# Patient Record
Sex: FEMALE | Race: WHITE | NOT HISPANIC OR LATINO | Employment: FULL TIME | URBAN - METROPOLITAN AREA
[De-identification: names, ages, dates, MRNs, and addresses within clinical notes are randomized per-mention and may not be internally consistent; named-entity substitution may affect disease eponyms.]

---

## 2018-05-23 ENCOUNTER — DOCUMENTATION (OUTPATIENT)
Dept: BARIATRICS/WEIGHT MGMT | Facility: CLINIC | Age: 43
End: 2018-05-23

## 2018-05-23 DIAGNOSIS — R73.03 PRE-DIABETES: ICD-10-CM

## 2018-05-23 DIAGNOSIS — R53.83 FATIGUE, UNSPECIFIED TYPE: ICD-10-CM

## 2018-05-23 DIAGNOSIS — R06.00 DYSPNEA, UNSPECIFIED TYPE: Primary | ICD-10-CM

## 2018-05-23 RX ORDER — ATORVASTATIN CALCIUM 20 MG/1
20 TABLET, FILM COATED ORAL DAILY
COMMUNITY

## 2018-05-23 RX ORDER — HYDROCHLOROTHIAZIDE 12.5 MG/1
12.5 TABLET ORAL DAILY
COMMUNITY

## 2018-05-23 RX ORDER — FLUOXETINE HYDROCHLORIDE 20 MG/1
20 CAPSULE ORAL DAILY
COMMUNITY

## 2018-05-23 RX ORDER — AMLODIPINE BESYLATE 5 MG/1
5 TABLET ORAL DAILY
COMMUNITY

## 2018-05-23 RX ORDER — FENOFIBRATE 134 MG/1
134 CAPSULE ORAL
COMMUNITY

## 2018-05-23 RX ORDER — LEVOTHYROXINE SODIUM 112 UG/1
112 TABLET ORAL DAILY
COMMUNITY

## 2018-05-30 DIAGNOSIS — R06.00 DYSPNEA, UNSPECIFIED TYPE: ICD-10-CM

## 2018-05-30 DIAGNOSIS — R53.83 FATIGUE, UNSPECIFIED TYPE: ICD-10-CM

## 2018-05-30 DIAGNOSIS — R73.03 PRE-DIABETES: ICD-10-CM

## 2018-05-31 ENCOUNTER — OFFICE VISIT (OUTPATIENT)
Dept: BARIATRICS/WEIGHT MGMT | Facility: CLINIC | Age: 43
End: 2018-05-31

## 2018-05-31 VITALS
OXYGEN SATURATION: 99 % | HEART RATE: 81 BPM | DIASTOLIC BLOOD PRESSURE: 73 MMHG | HEIGHT: 69 IN | BODY MASS INDEX: 36.73 KG/M2 | WEIGHT: 248.01 LBS | RESPIRATION RATE: 18 BRPM | SYSTOLIC BLOOD PRESSURE: 108 MMHG | TEMPERATURE: 98.6 F

## 2018-05-31 DIAGNOSIS — M19.90 OSTEOARTHRITIS, UNSPECIFIED OSTEOARTHRITIS TYPE, UNSPECIFIED SITE: ICD-10-CM

## 2018-05-31 DIAGNOSIS — R53.83 FATIGUE, UNSPECIFIED TYPE: Primary | ICD-10-CM

## 2018-05-31 DIAGNOSIS — E78.5 HYPERLIPIDEMIA, UNSPECIFIED HYPERLIPIDEMIA TYPE: ICD-10-CM

## 2018-05-31 DIAGNOSIS — R10.13 DYSPEPSIA: ICD-10-CM

## 2018-05-31 DIAGNOSIS — M54.9 BACK PAIN, UNSPECIFIED BACK LOCATION, UNSPECIFIED BACK PAIN LATERALITY, UNSPECIFIED CHRONICITY: ICD-10-CM

## 2018-05-31 DIAGNOSIS — I10 ESSENTIAL HYPERTENSION: ICD-10-CM

## 2018-05-31 DIAGNOSIS — R06.00 DYSPNEA, UNSPECIFIED TYPE: ICD-10-CM

## 2018-05-31 DIAGNOSIS — R73.03 PRE-DIABETES: ICD-10-CM

## 2018-05-31 DIAGNOSIS — E66.9 OBESITY, CLASS II, BMI 35-39.9: ICD-10-CM

## 2018-05-31 PROCEDURE — 99204 OFFICE O/P NEW MOD 45 MIN: CPT | Performed by: SURGERY

## 2018-05-31 RX ORDER — METOPROLOL SUCCINATE 25 MG/1
25 TABLET, EXTENDED RELEASE ORAL DAILY
COMMUNITY

## 2018-05-31 NOTE — PROGRESS NOTES
[facility] GROUP BARIATRIC SURGERY  [address]  [facility] [address]  [phone]      Patient  Name:  Caroline Christian  :  1975      Date of Visit: 2018      Chief Complaint:  weight gain; unable to maintain weight loss    History of Present Illness:  Caroline Christian is a 42 y.o. female who presents today for evaluation, education and consultation regarding weight loss surgery. The patient is interested in sleeve gastrectomy.     Caroline has been overweight for at least 16 years, has been 35 pounds or more overweight for at least 16 years, has been 100 pounds or more overweight for 16 or more years and started dieting at age 23.  The patient describes their eating habits as eating wrong foods.      Previous diet attempts include: High Protein and Reed's Diet; None; None.  The most weight Caroline lost was 50 pounds on high protein but was only able to maintain that weight loss for 8 months.  Her maximum lifetime weight is 271 pounds.    As above, patient has been overweight for many years, with numerous failed dietary/weight loss attempts.  She now has obesity related comorbidities and as such has decided to pursue weight loss surgery.    All past medical, surgical, social and family history have been obtained and discussed as pertinent to bariatric surgery as below.   Her friend had a sleeve with Dr. Asif recently and is doing great!      Past Medical History:   Diagnosis Date   • Anxiety    • B12 deficiency    • Back pain    • Fatigue    • Hyperlipidemia    • Hypertension    • Hypertension    • Hypothyroid    • Osteoarthritis    • Prediabetes    • Snoring    • Vitamin D deficiency      Past Surgical History:   Procedure Laterality Date   •  SECTION  ,   • HYSTERECTOMY      laparoscopic hysterectomy and oophorectomy for fibroids   • LAPAROSCOPIC CHOLECYSTECTOMY  2004    function at 0%   • TUBAL ABDOMINAL LIGATION         Allergies no known  allergies    Current Outpatient Prescriptions:   •  amLODIPine (NORVASC) 5 MG tablet, Take 5 mg by mouth Daily., Disp: , Rfl:   •  atorvastatin (LIPITOR) 20 MG tablet, Take 20 mg by mouth Daily., Disp: , Rfl:   •  fenofibrate micronized (LOFIBRA) 134 MG capsule, Take 134 mg by mouth Every Morning Before Breakfast., Disp: , Rfl:   •  FLUoxetine (PROzac) 20 MG capsule, Take 20 mg by mouth Daily., Disp: , Rfl:   •  hydrochlorothiazide (HYDRODIURIL) 12.5 MG tablet, Take 12.5 mg by mouth Daily., Disp: , Rfl:   •  levothyroxine (SYNTHROID, LEVOTHROID) 112 MCG tablet, Take 112 mcg by mouth Daily., Disp: , Rfl:   •  Liraglutide (VICTOZA) 18 MG/3ML solution pen-injector injection, Inject 1.8 mg under the skin Daily., Disp: , Rfl:   •  metFORMIN (GLUCOPHAGE) 500 MG tablet, Take 500 mg by mouth 2 (Two) Times a Day With Meals., Disp: , Rfl:   •  metoprolol succinate XL (TOPROL-XL) 25 MG 24 hr tablet, Take 25 mg by mouth Daily., Disp: , Rfl:     Social History     Social History   • Marital status:      Spouse name: Luca Christian   • Number of children: 2   • Years of education: Associates Degree      Occupational History   • [employer]     no heavy lifting     Social History Main Topics   • Smoking status: Never Smoker   • Smokeless tobacco: Never Used      Comment: No secondhand smoke in house or car   • Alcohol use No   • Drug use: No   • Sexual activity: Not on file     Other Topics Concern   • Not on file     Social History Narrative    Lives with  and children.     Family History   Problem Relation Age of Onset   • Heart attack Mother 47         of massive heart attack   • Hypertension Mother    • Sleep apnea Mother    • Heart attack Father    • Diabetes Father    • Hypertension Father    • Cancer Maternal Grandmother    • Heart attack Paternal Grandfather        Review of Systems   Constitutional: Positive for fatigue and unexpected weight gain. Negative for chills,  diaphoresis, fever and unexpected weight loss.   HENT: Negative for congestion and facial swelling.    Eyes: Negative for blurred vision, double vision and discharge.   Respiratory: Negative for chest tightness, shortness of breath and stridor.    Cardiovascular: Negative for chest pain, palpitations and leg swelling.   Gastrointestinal: Negative for blood in stool and constipation.   Endocrine: Negative for polydipsia.   Genitourinary: Negative for frequency and hematuria.   Musculoskeletal: Positive for arthralgias and back pain.   Skin: Negative for color change.   Allergic/Immunologic: Negative for immunocompromised state.   Neurological: Negative for weakness and confusion.   Psychiatric/Behavioral: Negative for self-injury. The patient is nervous/anxious.         Physical Exam:  Vital Signs:  Weight: 112 kg (248 lb 0.2 oz)   Body mass index is 36.62 kg/m².  Temp: 98.6 °F (37 °C)   Heart Rate: 81   BP: 108/73     Physical Exam   Constitutional: She is oriented to person, place, and time. She appears well-developed and well-nourished.   HENT:   Head: Normocephalic and atraumatic.   Nose: Nose normal.   Eyes: Conjunctivae and EOM are normal. Pupils are equal, round, and reactive to light.   Neck: Normal range of motion. Neck supple. Carotid bruit is not present. No tracheal deviation present. No thyromegaly present.   Cardiovascular: Normal rate, regular rhythm and normal heart sounds.    Pulmonary/Chest: Effort normal and breath sounds normal. No respiratory distress.   Abdominal: Soft. She exhibits no distension. There is no hepatosplenomegaly. There is no tenderness.   Lap incision, C section scar   Musculoskeletal: Normal range of motion. She exhibits no edema or deformity.   Neurological: She is alert and oriented to person, place, and time. No cranial nerve deficit. Coordination normal.   Skin: Skin is warm and dry. No rash noted.   Psychiatric: She has a normal mood and affect. Her behavior is normal.  Judgment and thought content normal.   Vitals reviewed.      There is no problem list on file for this patient.      Assessment:    Caroline Christian is a 42 y.o. year old female with medically complicated obesity pursuing sleeve gastrectomy.    Weight loss surgery is deemed medically necessary given the following obesity related comorbidities including diabetes, hypertension, dyslipidemia, osteoarthritis, back pain and knee pain with current Weight: 112 kg (248 lb 0.2 oz) and Body mass index is 36.62 kg/m²..    She is a good candidate for weight loss surgery pending further evaluation.    Plan:  The consultation plan and program requirements were reviewed with the patient.  The patient has been advised that a letter of medical support must be obtained from her primary care physician or referring provider. A psychological evaluation will be arranged.  A nutritional evaluation will be performed.  The patient was advised to start a high protein and low carbohydrate diet.  Necessary lifestyle modifications were discussed.  Instructions on how to access MARIANA was given to the patient.  MARIANA is an internet based educational video that explains the surgical procedure chosen and answers basic questions regarding that procedure.     Preoperative testing will include: CBC, CMP, Lipids, TSH, H.Pylori, CXR and EKG     Preop clearances required prior to surgery will include Cardiac.      Patient understands that bariatric surgery is not cosmetic surgery but rather a tool to help make a lifelong commitment to lifestyle changes including diet, exercise, behavior modifications, and healthy habits.  The patient has been educated on expected postoperative lifestyle changes, including commitment to high protein diet, vitamin regimen, and exercise program.  They are aware that support groups are encouraged for optimal weight loss results.        I spent 9 minutes discussion smoking cessation and/or avoidance of second-hand smoke.       I spent 45 minutes with the patient and 40 minutes was spent counseling.          Chapis Horne MD

## 2018-06-01 LAB
ALBUMIN SERPL-MCNC: 4.2 G/DL (ref 3.5–5.5)
ALBUMIN/GLOB SERPL: 1.6 {RATIO} (ref 1.2–2.2)
ALP SERPL-CCNC: 36 IU/L (ref 39–117)
ALT SERPL-CCNC: 16 IU/L (ref 0–32)
AST SERPL-CCNC: 18 IU/L (ref 0–40)
BILIRUB SERPL-MCNC: <0.2 MG/DL (ref 0–1.2)
BUN SERPL-MCNC: 16 MG/DL (ref 6–24)
BUN/CREAT SERPL: 17 (ref 9–23)
CALCIUM SERPL-MCNC: 9.6 MG/DL (ref 8.7–10.2)
CHLORIDE SERPL-SCNC: 99 MMOL/L (ref 96–106)
CHOLEST SERPL-MCNC: 170 MG/DL (ref 100–199)
CO2 SERPL-SCNC: 28 MMOL/L (ref 18–29)
CREAT SERPL-MCNC: 0.95 MG/DL (ref 0.57–1)
ERYTHROCYTE [DISTWIDTH] IN BLOOD BY AUTOMATED COUNT: 14.1 % (ref 12.3–15.4)
GFR SERPLBLD CREATININE-BSD FMLA CKD-EPI: 75 ML/MIN/1.73
GFR SERPLBLD CREATININE-BSD FMLA CKD-EPI: 86 ML/MIN/1.73
GLOBULIN SER CALC-MCNC: 2.7 G/DL (ref 1.5–4.5)
GLUCOSE SERPL-MCNC: 85 MG/DL (ref 65–99)
HBA1C MFR BLD: 5.4 % (ref 4.8–5.6)
HCT VFR BLD AUTO: 37.9 % (ref 34–46.6)
HDLC SERPL-MCNC: 35 MG/DL
HGB BLD-MCNC: 12.3 G/DL (ref 11.1–15.9)
LDLC SERPL CALC-MCNC: 93 MG/DL (ref 0–99)
MCH RBC QN AUTO: 28.3 PG (ref 26.6–33)
MCHC RBC AUTO-ENTMCNC: 32.5 G/DL (ref 31.5–35.7)
MCV RBC AUTO: 87 FL (ref 79–97)
PLATELET # BLD AUTO: 355 X10E3/UL (ref 150–379)
POTASSIUM SERPL-SCNC: 3.9 MMOL/L (ref 3.5–5.2)
PROT SERPL-MCNC: 6.9 G/DL (ref 6–8.5)
RBC # BLD AUTO: 4.34 X10E6/UL (ref 3.77–5.28)
SODIUM SERPL-SCNC: 141 MMOL/L (ref 134–144)
TRIGL SERPL-MCNC: 210 MG/DL (ref 0–149)
TSH SERPL DL<=0.005 MIU/L-ACNC: 3.19 UIU/ML (ref 0.45–4.5)
VLDLC SERPL CALC-MCNC: 42 MG/DL (ref 5–40)
WBC # BLD AUTO: 6.4 X10E3/UL (ref 3.4–10.8)

## 2018-06-04 ENCOUNTER — DOCUMENTATION (OUTPATIENT)
Dept: BARIATRICS/WEIGHT MGMT | Facility: CLINIC | Age: 43
End: 2018-06-04

## 2018-06-04 NOTE — PROGRESS NOTES
"Weight Loss Surgery  Presurgical Nutrition Assessment     Caroline Christian  2018 (Entry for 2018 nutrition consult).  [id number]  [id number]  1975  female    Surgery desired: Sleeve Gastrectomy  Ht 175.3 cm (69\"); Wt 112 kg (248 #); BMI 36.6  Past Medical History:   Diagnosis Date   • Anxiety    • B12 deficiency    • Back pain    • Fatigue    • Hyperlipidemia    • Hypertension    • Hypertension    • Hypothyroid    • Osteoarthritis    • Prediabetes    • Snoring    • Vitamin D deficiency      Past Surgical History:   Procedure Laterality Date   •  SECTION  ,   • HYSTERECTOMY      laparoscopic hysterectomy and oophorectomy for fibroids   • LAPAROSCOPIC CHOLECYSTECTOMY      function at 0%   • TUBAL ABDOMINAL LIGATION       No Known Allergies    Current Outpatient Prescriptions:   •  amLODIPine (NORVASC) 5 MG tablet, Take 5 mg by mouth Daily., Disp: , Rfl:   •  atorvastatin (LIPITOR) 20 MG tablet, Take 20 mg by mouth Daily., Disp: , Rfl:   •  fenofibrate micronized (LOFIBRA) 134 MG capsule, Take 134 mg by mouth Every Morning Before Breakfast., Disp: , Rfl:   •  FLUoxetine (PROzac) 20 MG capsule, Take 20 mg by mouth Daily., Disp: , Rfl:   •  hydrochlorothiazide (HYDRODIURIL) 12.5 MG tablet, Take 12.5 mg by mouth Daily., Disp: , Rfl:   •  levothyroxine (SYNTHROID, LEVOTHROID) 112 MCG tablet, Take 112 mcg by mouth Daily., Disp: , Rfl:   •  Liraglutide (VICTOZA) 18 MG/3ML solution pen-injector injection, Inject 1.8 mg under the skin Daily., Disp: , Rfl:   •  metFORMIN (GLUCOPHAGE) 500 MG tablet, Take 500 mg by mouth 2 (Two) Times a Day With Meals., Disp: , Rfl:   •  metoprolol succinate XL (TOPROL-XL) 25 MG 24 hr tablet, Take 25 mg by mouth Daily., Disp: , Rfl:       Nutrition Assessment    Estimated energy needs:  2010 kcal    Estimated calories for weight loss:  1500 kcal    IBW (Pounds):  170 #        Excess body weight (Pounds):  80 #       Nutrition Recall  24 Hour recall: " (B) (L) (D) -  Reviewed and discussed with patient.  Pt eating reasonably well at this time.  Primary beverage is water.  Also drinks 1 can diet Dr Hanson qd.  States basic principles of diet for wt mgt.  Brkfast @ 6 am - 3 slices rivera, 2 eggs, 1 bottle water.  Snack @ 9 am = 1 small apple.  Lunch @ 11:30 = shredded ham sandwich /c 3 oz meat , 6 snack pickles & 1.5 cups watermelon.  Dinner @ 5:30 - Grilled chicken-5 oz /c 1 cup broccoli /c cheese & 1 container blueberry yogurt.  Brkfast is often 1.5 cup cottage cheese.  Pt states principles of diet for wt mgt and has incorporated many into her diet.         Exercise  daily - walks 45 minutes qd      Education    Provided information packet re:  Sleeve Gastrectomy  1. Reviewed guidelines for higher protein, limited carbohydrate diet to promote weight loss.  Encouraged patient to incorporate these principles of healthy eating from now until approximately 2 weeks prior to bariatric surgery date, when an even lower carbohydrate “liver-shrinking” regimen will be followed. (Information sheet re pre-op diet given).  Explained that after recovery from surgery this diet will again be followed to ensure further loss and for weight maintenance.    2. Encouraged patient to choose an acceptable protein supplement powder or shake for post-surgery liquid diet.  Provided product guidelines and examples.    3. Explained importance of goal setting to help in changing eating behaviors that are not conducive to weight loss.  Targeted several on a worksheet which also included spaces for patient to work on issues specific to them.  4. Provided follow-up options for support, including contact information for dietitians here, if desired.  Web-based support information and apps for smart phones and computers given.  Noted that monthly support group is offered at this clinic, and that support is associated with successful weight loss.    Recommend that team proceed with surgery and follow  per protocol.      Nutrition Goals   Dietary Guidelines per information packet as described above  Protein goal:  grams per day   Carbohydrate goal:  100-140 grams per day  Eliminate soda, sweet tea, etc.     Exercise Goals  Continue current exercise routine   Add 15-30 minutes of activity per day as tolerated      Ledy Perez RD  06/04/2018  2:47 PM